# Patient Record
Sex: FEMALE | Race: BLACK OR AFRICAN AMERICAN
[De-identification: names, ages, dates, MRNs, and addresses within clinical notes are randomized per-mention and may not be internally consistent; named-entity substitution may affect disease eponyms.]

---

## 2021-10-08 ENCOUNTER — HOSPITAL ENCOUNTER (OUTPATIENT)
Dept: HOSPITAL 5 - TRG | Age: 26
Discharge: HOME | End: 2021-10-08
Attending: OBSTETRICS & GYNECOLOGY
Payer: COMMERCIAL

## 2021-10-08 VITALS — SYSTOLIC BLOOD PRESSURE: 106 MMHG | DIASTOLIC BLOOD PRESSURE: 66 MMHG

## 2021-10-08 DIAGNOSIS — O26.893: ICD-10-CM

## 2021-10-08 DIAGNOSIS — Z3A.35: ICD-10-CM

## 2021-10-08 DIAGNOSIS — Z34.93: Primary | ICD-10-CM

## 2021-10-08 DIAGNOSIS — O36.8130: ICD-10-CM

## 2021-10-08 PROCEDURE — 76815 OB US LIMITED FETUS(S): CPT

## 2021-10-08 PROCEDURE — 59025 FETAL NON-STRESS TEST: CPT

## 2021-10-08 PROCEDURE — 76816 OB US FOLLOW-UP PER FETUS: CPT

## 2021-10-08 PROCEDURE — 76819 FETAL BIOPHYS PROFIL W/O NST: CPT

## 2021-10-08 NOTE — ULTRASOUND REPORT
ULTRASOUND FETAL BIOPHYSICAL PROFILE



INDICATION / CLINICAL INFORMATION: EARLIER TODAY BPP 6/8.

Clinical Gestational Age (GA) in weeks, days: 36 weeks 2 days 



TECHNIQUE: Transabdominal.



COMPARISON: Ultrasound performed earlier today



FINDINGS:



FETAL BREATHING MOVEMENT = 2

GROSS BODY MOVEMENT = 2

FETAL TONE = 2

QUALITATIVE AMNIOTIC FLUID VOLUME = 2



TOTAL BIOPHYSICAL SCORE = 8/8



FETAL HEART RATE (beats per minute): 37 



IMPRESSION:

1. Fetal Biophysical Score = 8/8 

2. This represents interval improvement compared with ultrasound performed earlier today for which th
ere was a score of 6 out of 8.



Signer Name: Tish Weir MD 

Signed: 10/8/2021 10:42 PM

Workstation Name: [id number]

## 2021-10-08 NOTE — ULTRASOUND REPORT
LIMITED OBSTETRICAL ULTRASOUND



INDICATION: Decreased fetal movement, JULIO assessment, 36 week 2 day pregnancy



COMPARISON: None



FINDINGS: Term pregnancy is seen in a cephalic position. Cardiac activity was documented with fetal h
eart rate of 128 bpm. Placenta is not well evaluated but is posterior and free of the internal cervic
al os. Amniotic fluid volume appears qualitatively within normal limits for this stage of pregnancy a
nd JULIO is within normal limits at 9.6 cm. Dating was not performed nor was an anatomic survey elvie carter



Signkeshav Name: Clinton Mclean MD 

Signed: 10/8/2021 4:26 PM

Workstation Name: [id number]

## 2021-10-08 NOTE — ULTRASOUND REPORT
ULTRASOUND FETAL BIOPHYSICAL PROFILE



INDICATION / CLINICAL INFORMATION:

DECREASED FETAL MOVEMENT - BPP.



COMPARISON:

No prior imaging available for comparison.



FINDINGS:



FETAL BREATHING MOVEMENT = 0

GROSS BODY MOVEMENT = 2

FETAL TONE = 2

QUALITATIVE AMNIOTIC FLUID VOLUME = 2



TOTAL BIOPHYSICAL SCORE = 6/8

PRESENTATION: Cephalic. 

FETAL HEART RATE (beats per minute): 127 



IMPRESSION:

1. Single live intrauterine pregnancy. Fetal biophysical profile = 6/8, related to diminished fetal b
reathing. Close clinical surveillance is recommended.



Signer Name: Ran Coleman MD 

Signed: 10/8/2021 4:42 PM

Workstation Name: [id number]

## 2021-10-08 NOTE — EVENT NOTE
Date: 10/08/21


 pregnancy at 36+ weeks with decreased fetal movement.  BPP 8/10 with no 

breathing.  pt given steroids in anticipation of possible delivery.  

Betamethasone 12mg IM given, first dose.  Pt allowed to remain in triage for 

6hrs and given diet.  Repeat BPP 10/10 and pt discharged home.  PTL precaution 

given and pt admits to feeling baby moving well.  All questions encouraged and 

answered.

## 2021-10-09 ENCOUNTER — HOSPITAL ENCOUNTER (OUTPATIENT)
Dept: HOSPITAL 5 - TRG | Age: 26
Discharge: HOME | End: 2021-10-09
Attending: OBSTETRICS & GYNECOLOGY
Payer: COMMERCIAL

## 2021-10-09 DIAGNOSIS — Z34.93: Primary | ICD-10-CM

## 2021-10-09 DIAGNOSIS — Z3A.36: ICD-10-CM

## 2021-10-09 PROCEDURE — 96372 THER/PROPH/DIAG INJ SC/IM: CPT

## 2021-10-13 ENCOUNTER — HOSPITAL ENCOUNTER (INPATIENT)
Dept: HOSPITAL 5 - TRG | Age: 26
LOS: 2 days | Discharge: HOME | End: 2021-10-15
Attending: OBSTETRICS & GYNECOLOGY | Admitting: OBSTETRICS & GYNECOLOGY
Payer: COMMERCIAL

## 2021-10-13 DIAGNOSIS — Z3A.37: ICD-10-CM

## 2021-10-13 DIAGNOSIS — Z20.822: ICD-10-CM

## 2021-10-13 DIAGNOSIS — D62: ICD-10-CM

## 2021-10-13 DIAGNOSIS — Z23: ICD-10-CM

## 2021-10-13 LAB
BASOPHILS # (AUTO): 0 K/MM3 (ref 0–0.1)
BASOPHILS NFR BLD AUTO: 0.2 % (ref 0–1.8)
EOSINOPHIL # BLD AUTO: 0 K/MM3 (ref 0–0.4)
EOSINOPHIL NFR BLD AUTO: 0.1 % (ref 0–4.3)
HCT VFR BLD CALC: 35.4 % (ref 30.3–42.9)
HGB BLD-MCNC: 11.7 GM/DL (ref 10.1–14.3)
LYMPHOCYTES # BLD AUTO: 1.4 K/MM3 (ref 1.2–5.4)
LYMPHOCYTES NFR BLD AUTO: 16.5 % (ref 13.4–35)
MCHC RBC AUTO-ENTMCNC: 33 % (ref 30–34)
MCV RBC AUTO: 82 FL (ref 79–97)
MONOCYTES # (AUTO): 0.4 K/MM3 (ref 0–0.8)
MONOCYTES % (AUTO): 5.3 % (ref 0–7.3)
PLATELET # BLD: 274 K/MM3 (ref 140–440)
RBC # BLD AUTO: 4.31 M/MM3 (ref 3.65–5.03)

## 2021-10-13 PROCEDURE — 86850 RBC ANTIBODY SCREEN: CPT

## 2021-10-13 PROCEDURE — U0003 INFECTIOUS AGENT DETECTION BY NUCLEIC ACID (DNA OR RNA); SEVERE ACUTE RESPIRATORY SYNDROME CORONAVIRUS 2 (SARS-COV-2) (CORONAVIRUS DISEASE [COVID-19]), AMPLIFIED PROBE TECHNIQUE, MAKING USE OF HIGH THROUGHPUT TECHNOLOGIES AS DESCRIBED BY CMS-2020-01-R: HCPCS

## 2021-10-13 PROCEDURE — 76816 OB US FOLLOW-UP PER FETUS: CPT

## 2021-10-13 PROCEDURE — 86901 BLOOD TYPING SEROLOGIC RH(D): CPT

## 2021-10-13 PROCEDURE — 90715 TDAP VACCINE 7 YRS/> IM: CPT

## 2021-10-13 PROCEDURE — 76819 FETAL BIOPHYS PROFIL W/O NST: CPT

## 2021-10-13 PROCEDURE — 85014 HEMATOCRIT: CPT

## 2021-10-13 PROCEDURE — 76815 OB US LIMITED FETUS(S): CPT

## 2021-10-13 PROCEDURE — 88307 TISSUE EXAM BY PATHOLOGIST: CPT

## 2021-10-13 PROCEDURE — 86900 BLOOD TYPING SEROLOGIC ABO: CPT

## 2021-10-13 PROCEDURE — 85018 HEMOGLOBIN: CPT

## 2021-10-13 PROCEDURE — 90471 IMMUNIZATION ADMIN: CPT

## 2021-10-13 PROCEDURE — 36415 COLL VENOUS BLD VENIPUNCTURE: CPT

## 2021-10-13 PROCEDURE — 85025 COMPLETE CBC W/AUTO DIFF WBC: CPT

## 2021-10-13 PROCEDURE — 84112 EVAL AMNIOTIC FLUID PROTEIN: CPT

## 2021-10-13 NOTE — ULTRASOUND REPORT
ULTRASOUND OBSTETRIC 



INDICATION / CLINICAL INFORMATION: NEED FOR nrfht.

Clinical Gestational Age (GA) in weeks, days: 37, 0 



TECHNIQUE: Transabdominal.



COMPARISON: 10/8/2021



FINDINGS:

Single intrauterine pregnancy. 



Biparietal Diameter = 9 cm = 36, 3 weeks, days

Head Circumference = 32.7 cm = 37, 1 weeks, days

Abdominal Circumference = 30.3 cm = 34, 2 weeks, days

Femur Length = 6.9 cm = 35, 4 weeks, days

Average Ultrasound Age (AUA) = 35, 6 weeks, days



Fetal Heart Rate: 176  beats per minute. 

Estimated Fetal Birth Weight in grams (if calculated): 2611





Fetal Position: cephalic. 





Amniotic Fluid Volume: normal 

Amniotic Fluid Index (JULIO) in cm (if calculated): 9.6.



IMPRESSION:

1. Single, living intrauterine pregnancy with estimated sonographic age of 35, 6 weeks, days.

2. No significant sonographic abnormality.





ULTRASOUND FETAL BIOPHYSICAL PROFILE



INDICATION / CLINICAL INFORMATION: NEED FOR nrfht.



COMPARISON: 10/8/2021



FINDINGS:



FETAL BREATHING MOVEMENT = 2

GROSS BODY MOVEMENT = 2

FETAL TONE = 2

QUALITATIVE AMNIOTIC FLUID VOLUME = 2



TOTAL BIOPHYSICAL SCORE = 8/8



AMNIOTIC FLUID INDEX (cm) =  9.6

PRESENTATION: Cephalic. 

FETAL HEART RATE (beats per minute): 176 



IMPRESSION:

1. Fetal biophysical profile = 8/8



Signer Name: Javon Riley MD 

Signed: 10/13/2021 6:16 PM

Workstation Name: [id number]

## 2021-10-13 NOTE — ULTRASOUND REPORT
ULTRASOUND OBSTETRIC LIMITED 



INDICATION / CLINICAL INFORMATION: JULIO.

Clinical Gestational Age (GA) in weeks, days: 37, 0



TECHNIQUE: Transabdominal.



COMPARISON: Ultrasound from earlier in the day.



FINDINGS:



FETAL HEART RATE (beats per minute): 138 



AMNIOTIC FLUID INDEX (cm) =  13.5   (normal = 7-24 cm)



PRESENTATION: Cephalic. 



ADDITIONAL FINDINGS: None.



IMPRESSION:

1. No significant abnormality.



Signer Name: Cruzito Seals DO 

Signed: 10/13/2021 10:20 PM

Workstation Name: [id number]

## 2021-10-13 NOTE — ULTRASOUND REPORT
ULTRASOUND OBSTETRIC 



INDICATION / CLINICAL INFORMATION: NEED FOR nrfht.

Clinical Gestational Age (GA) in weeks, days: 37, 0 



TECHNIQUE: Transabdominal.



COMPARISON: 10/8/2021



FINDINGS:

Single intrauterine pregnancy. 



Biparietal Diameter = 9 cm = 36, 3 weeks, days

Head Circumference = 32.7 cm = 37, 1 weeks, days

Abdominal Circumference = 30.3 cm = 34, 2 weeks, days

Femur Length = 6.9 cm = 35, 4 weeks, days

Average Ultrasound Age (AUA) = 35, 6 weeks, days



Fetal Heart Rate: 176  beats per minute. 

Estimated Fetal Birth Weight in grams (if calculated): 2611





Fetal Position: cephalic. 





Amniotic Fluid Volume: normal 

Amniotic Fluid Index (JULIO) in cm (if calculated): 9.6.



IMPRESSION:

1. Single, living intrauterine pregnancy with estimated sonographic age of 35, 6 weeks, days.

2. No significant sonographic abnormality.





ULTRASOUND FETAL BIOPHYSICAL PROFILE



INDICATION / CLINICAL INFORMATION: NEED FOR nrfht.



COMPARISON: 10/8/2021



FINDINGS:



FETAL BREATHING MOVEMENT = 2

GROSS BODY MOVEMENT = 2

FETAL TONE = 2

QUALITATIVE AMNIOTIC FLUID VOLUME = 2



TOTAL BIOPHYSICAL SCORE = 8/8



AMNIOTIC FLUID INDEX (cm) =  9.6

PRESENTATION: Cephalic. 

FETAL HEART RATE (beats per minute): 176 



IMPRESSION:

1. Fetal biophysical profile = 8/8



Signer Name: Javon Riley MD 

Signed: 10/13/2021 6:16 PM

Workstation Name: [id number]

## 2021-10-14 LAB
HCT VFR BLD CALC: 29.4 % (ref 30.3–42.9)
HGB BLD-MCNC: 9.9 GM/DL (ref 10.1–14.3)

## 2021-10-14 PROCEDURE — 3E0R3BZ INTRODUCTION OF ANESTHETIC AGENT INTO SPINAL CANAL, PERCUTANEOUS APPROACH: ICD-10-PCS | Performed by: OBSTETRICS & GYNECOLOGY

## 2021-10-14 PROCEDURE — 00HU33Z INSERTION OF INFUSION DEVICE INTO SPINAL CANAL, PERCUTANEOUS APPROACH: ICD-10-PCS | Performed by: OBSTETRICS & GYNECOLOGY

## 2021-10-14 PROCEDURE — 0KQM0ZZ REPAIR PERINEUM MUSCLE, OPEN APPROACH: ICD-10-PCS | Performed by: OBSTETRICS & GYNECOLOGY

## 2021-10-14 RX ADMIN — IBUPROFEN SCH MG: 600 TABLET, FILM COATED ORAL at 08:37

## 2021-10-14 RX ADMIN — IBUPROFEN SCH MG: 600 TABLET, FILM COATED ORAL at 14:17

## 2021-10-14 RX ADMIN — Medication SCH EACH: at 09:39

## 2021-10-14 RX ADMIN — IBUPROFEN SCH MG: 600 TABLET, FILM COATED ORAL at 20:53

## 2021-10-14 NOTE — ANESTHESIA CONSULTATION
Anesthesia Consult and Med Hx


Date of service: 10/14/21





- Airway


Anesthetic Teeth Evaluation: Good


ROM Head & Neck: Adequate


Mental/Hyoid Distance: Adequate


Mallampati Class: Class II


Intubation Access Assessment: Probably Good





- Pulmonary Exam


CTA: Yes





- Cardiac Exam


Cardiac Exam: RRR





- Pre-Operative Health Status


ASA Pre-Surgery Classification: ASA2


Proposed Anesthetic Plan: Epidural





- Pulmonary


Hx Smoking: No


Hx Asthma: No


COPD: No


Hx Pneumonia: No


Hx Sleep Apnea: No





- Cardiovascular System


Hx Hypertension: No


Hx Heart Attack/AMI: No


Hx Angina: No





- Central Nervous System


Hx Seizures: No


Hx Psychiatric Problems: No





- Gastrointestinal


Hx Gastroesophageal Reflux Disease: No





- Endocrine


Hx Renal Disease: No


Hx End Stage Renal Disease: No


Hx Liver Disease: No


Hx Insulin Dependent Diabetes: No


Hx Non-Insulin Dependent Diabetes: No


Hx Hypothyroidism: No


Hx Hyperthyroidism: No





- Hematic


Hx Anemia: No


Hx Sickle Cell Disease: No

## 2021-10-14 NOTE — HISTORY AND PHYSICAL REPORT
History of Present Illness


Date of examination: 10/14/21


Date of admission: 


10/14/2021





Chief complaint: 


Presents from office for observation due to a non-reactive NST.





History of present illness: 


Early entry to prenatal care, prenatal course uncomplicated








Past History


Past Medical History: no pertinent history


Past Surgical History: no surgical history


Family/Genetic History: none


Social history: no significant social history, single





- Obstetrical History


Expected Date of Delivery: 21


Actual Gestation: 37 Week(s) 1 Day(s) 


: 1





Medications and Allergies


                                    Allergies











Allergy/AdvReac Type Severity Reaction Status Date / Time


 


No Known Allergies Allergy   Unverified 10/08/21 14:05











                                Home Medications











 Medication  Instructions  Recorded  Confirmed  Last Taken  Type


 


Prenatal Multivitamin Tablet  10/13/21  10/12/21 History











Active Meds: 


Active Medications





Acetaminophen (Acetaminophen 325 Mg Tab)  650 mg PO Q4H PRN


   PRN Reason: Pain, Mild (1-3)


Butorphanol Tartrate (Butorphanol 2 Mg/1 Ml Inj)  2 mg IV Q2H PRN


   PRN Reason: Pain , Severe (7-10)


Carboprost Tromethamine (Carboprost Tromethamine 250 Mcg/1 Ml Inj)  250 mcg IM 

ONCE PRN


   PRN Reason: Uterine Bleeding


Ephedrine Sulfate (Ephedrine Sulfate 50 Mg/1 Ml Inj)  10 mg IV Q2M PRN


   PRN Reason: Hypotension


Lactated Ringer's (Lactated Ringers)  1,000 mls @ 125 mls/hr IV AS DIRECT KIRAN


Oxytocin/Sodium Chloride (Pitocin/Ns 30 Unit/500ml)  30 units in 500 mls @ 2 

mls/hr IV TITR KIRAN; Protocol


Lactated Ringer's (Lactated Ringers)  1,000 mls @ 125 mls/hr IV AS DIRECT KIRAN


Oxytocin/Sodium Chloride (Pitocin/Ns 30 Unit/500ml)  30 units in 500 mls @ 40 

mls/hr IV TITR KIRAN; Protocol


Lidocaine (Lidocaine (2%) 20 Mg/1 Ml Vial 20 Ml Mdv)  20 ml INFILTRATI ONCE ONE


   Stop: 10/14/21 00:37


Loperamide HCl (Loperamide 2 Mg Cap)  2 mg PO ONCE PRN


   PRN Reason: give with Hemabate


Methylergonovine Maleate (Methylergonovine Maleate 0.2 Mg/Ml Vial)  0.2 mg IM 

ONCE PRN


   PRN Reason: Uterine Bleeding


Mineral Oil (Mineral Oil 30 Ml Oral Liqd)  30 ml PO QHS PRN


   PRN Reason: Constipation


Misoprostol (Misoprostol 200 Mcg Tab)  800 mcg MA ONCE PRN


   PRN Reason: Uterine Bleeding


Naloxone HCl (Naloxone 0.4 Mg/1 Ml Inj)  0.1 mg IV Q2MIN PRN


   PRN Reason: Res Rate </= 8 or 02 SAT < 92%


Ondansetron HCl (Ondansetron 4 Mg/2 Ml Inj)  4 mg IV Q8H PRN


   PRN Reason: Nausea And Vomiting


Oxytocin (Oxytocin 10 Unit/1 Ml Inj)  10 unit IM ONCE PRN


   PRN Reason: Uterine Bleeding


Terbutaline Sulfate (Terbutaline 1 Mg/1 Ml Inj)  0.25 mg SUB-Q ONCE PRN


   PRN Reason: Hyperstimulation/Hypertonicity











Review of Systems


All systems: negative





- Vital Signs


Vital signs: 


                                   Vital Signs











Pulse BP Pulse Ox


 


 95 H  117/66   99 


 


 10/13/21 16:28  10/13/21 16:28  10/13/21 16:28








                                        











Temp Pulse Resp BP Pulse Ox


 


 98.4 F   64   24   120/60   99 


 


 10/13/21 22:09  10/14/21 00:39  10/13/21 22:09  10/13/21 19:04  10/14/21 00:39














- Physical Exam


Breasts: Positive: normal


Cardiovascular: Regular rate


Lungs: Positive: Clear to auscultation, Normal air movement


Abdomen: Positive: normal appearance, soft, normal bowel sounds


Genitourinary (Female): Positive: normal external genitalia, normal perenium


Uterus: Positive: enlarged


Anus/Rectum: Positive: normal perianal skin





- Obstetrical


FHR: category 1


Uterine Contraction Monitor Mode: External


Cervical Dilatation: 5 ( SROM of a large amount of clear fluid at 2341)


Cervical Effacement Percentage: 90


Fetal station: -1


Uterine Contraction Pattern: Regular


Uterine Tone Measurement Phase: Resting


Uterine Contraction Intensity: Moderate





Results


Result Diagrams: 


                                 10/13/21 18:37





                              Abnormal lab results











  10/13/21 10/13/21 Range/Units





  18:37 23:20 


 


MCH  27 L   (28-32)  pg


 


Seg Neutrophils %  77.9 H   (40.0-70.0)  %


 


Fetal Membranes Rupture   Positive A  (Negative)  








All other labs normal.








Assessment and Plan


A: IUP @ 37 1/7 Weeks


     Category I Tracing 


     SROM


     Active Labor


     GBS Negative





P: Admit to L&D per Routine Orders 


    Prepare for Epidural Anesthesia

## 2021-10-14 NOTE — PROGRESS NOTE
Labor Epidural





- Labor Epidural


Start Time: 00:56


Stop Time: 01:08


Performed by:: DALLAS CAMARA


Procedure: 





Patient is requesting epidural for labor and pain.  H&P, labs were reviewed.   

Patient IDed, H&P reviewed, all questions and concerns were answered, and 

consent was signed. Timeout was performed at bedside.  Patient in sitting 

position.  Sterile prep and drape was performed.  3ml of 1% lidocaine skin wheal

at L[3]- L [4].  18-gauge [employer] epidural needle was advanced to loss of 

resistance with air technique 5cm.  Negative CSF negative blood.  Epidural 

catheter advanced to [10] centimeters.  [negative] Aspiration [negative] test 

dose.  Sterile dressing applied.  Patient tolerated procedure.

## 2021-10-15 VITALS — DIASTOLIC BLOOD PRESSURE: 62 MMHG | SYSTOLIC BLOOD PRESSURE: 114 MMHG

## 2021-10-15 PROCEDURE — 3E0234Z INTRODUCTION OF SERUM, TOXOID AND VACCINE INTO MUSCLE, PERCUTANEOUS APPROACH: ICD-10-PCS | Performed by: OBSTETRICS & GYNECOLOGY

## 2021-10-15 RX ADMIN — Medication SCH EACH: at 09:53

## 2021-10-15 RX ADMIN — IBUPROFEN SCH MG: 600 TABLET, FILM COATED ORAL at 05:24

## 2021-10-15 NOTE — CONSULTATION
History of Present Illness





- Reason for Consult


Consult date: 10/15/21


Reason for consult: high depression score





- Chief Complaint


Chief complaint: 


Presents from office for observation due to a non-reactive NST.








- History of Present Psychiatric Illness


Basia Mays is a 26 year old female with no psychiatric history. In my interview

with the patient, she is calm and cooperative. The patient is naive to 

psychotropic medications. She denies being depressed and denies having excessive

anxiety. The patient denies suicidal/homicidal ideation and denies 

hallucinations.





PAST PSYCHIATRIC HISTORY:


Diagnoses: Denies


Suicide attempts or Self-harm behavior: Denies


Prior psychiatric hospitalizations: Denies


Substance Abuse history: Denies


Previous psychiatric medications tried: Denies


Outpatient treatment: Denies





PAST MEDICAL HISTORY: Unknown





Family Psychiatric History: None reported or documented





SOCIAL HISTORY


Marital Status: Single


Living Arrangements: ALives with boyfriend


Employment Status: Unemployed


Access to guns/weapons: Denies


Education: 10th grade


History of Abuse: Denies


Legal History: Denies





REVIEW OF SYSTEMS


Constitutional: Negative for weight loss


ENT: Negative for stridor


Respiratory: Negative for cough or hemoptysis


All other systems reviewed and are negative





MENTAL STATUS EXAMINATION


General Appearance and Behavior: Age appropriate, good hygiene, not wearing 

appropriate clothes, good eye contact, cooperative polite with questioning.


Cooperation: Participating/engaged


Psychomotor Behavior: Psychomotor normal


Mood: "fine"


Affect and affective range: Euthymic


Thought Process: Goal directed


Speech: Normal tone and pace


Thought Content: Not suicidal


   Suicidal Ideation: Denies 


   Homicidal Ideation: Denies 


   Hallucinations: Denies


   Delusions: Denies


Impulse Control: Limited


Insight and Judgment: Good insight and Good judgment


Memory: Normal


Attention:  Divided attention impaired


Orientation: A/o x 3 





 Assessment and Plan 


(1) 


(2) 





Treatment Plan





Medical: per primary


Disposition: Do not recommend acute psychiatric inpatient treatment


Will sign off. Thanks


Case staffed with Dr. Parekh





Medications and Allergies


                                        





Medications and Allergies


                                    Allergies











Allergy/AdvReac Type Severity Reaction Status Date / Time


 


No Known Allergies Allergy   Verified 10/14/21 07:02











                                Home Medications











 Medication  Instructions  Recorded  Confirmed  Last Taken  Type


 


Prenatal Multivitamin Tablet 1 tab PO DAILY 10/13/21 10/14/21 10/12/21 History











Active Meds: 


Active Medications





Hydrocodone Bitart/Acetaminophen (Hydrocodone/Acetaminophen 5-325 Mg Tab)  2 

each PO Q6H PRN


   PRN Reason: Pain, Moderate (4-6)


Bisacodyl (Bisacodyl 10 Mg Rect Supp)  10 mg AL BID PRN


   PRN Reason: Constipation


Ferrous Sulfate (Ferrous Sulfate 325 Mg Tab)  325 mg PO BID KIRAN


Ibuprofen (Ibuprofen 600 Mg Tab)  600 mg PO Q6H [facility]


   Last Admin: 10/15/21 05:24 Dose:  600 mg


   Documented by: 


Multi-Ingredient Ointment (Lanolin/Zinc/Dimethicone (Lansinoh) 7 Gm)  1 applic 

TP PRN PRN


   PRN Reason: Sore Nipples


Multivitamins/Iron/Calcium (Prenatal Zjd09-Qu Fumarate-Folic Acid Vit Tab)  1 

each PO QDAY [facility]


   Last Admin: 10/15/21 09:53 Dose:  1 each


   Documented by: 


Sodium Chloride (Sodium Chloride 0.9% 10 Ml Flush Syringe)  10 ml IV PRN PRN


   PRN Reason: LINE FLUSH


Witch Hazel/Glycerin (Witch Hazel/ Glycerin Pad)  1 each TP PRN PRN


   PRN Reason: Hemorrhoid/cleansing/soothing


   Last Admin: 10/14/21 08:37 Dose:  1 each


   Documented by: 











Mental Status Exam





- Vital signs


                                Last Vital Signs











Temp  97.9 F   10/15/21 08:43


 


Pulse  87   10/15/21 08:43


 


Resp  18   10/15/21 08:43


 


BP  105/54   10/15/21 08:43


 


Pulse Ox  97   10/15/21 08:43














Results


Result Diagrams: 


                                 10/14/21 13:27





                              Abnormal lab results











  10/14/21 Range/Units





  13:27 


 


Hgb  9.9 L  (10.1-14.3)  gm/dl


 


Hct  29.4 L D  (30.3-42.9)  %








All other labs normal.

## 2021-10-15 NOTE — PROGRESS NOTE
Assessment and Plan


A: Postpartum day 1 S/P .


Anemia.


P: Supplement with iron. 


Continue routine postpartum care.


Anticipate discharge home tomorrow if patient continues to do well. 








Subjective





- Subjective


Date of service: 10/15/21


Principal diagnosis: Postpartum day 1 S/P 


Patient reports: appetite normal, voiding normally, pain well controlled, 

flatus, ambulating normally, no dizzy ambulation, no nauseated


[place]: doing well





Objective





- Vital Signs


Latest vital signs: 


                                   Vital Signs











  Temp Pulse Resp BP Pulse Ox Pulse Ox


 


 10/15/21 08:43  97.9 F  87  18  105/54  97 


 


 10/15/21 06:24    18   


 


 10/15/21 05:24    18   


 


 10/15/21 01:32  98.1 F  85  22  112/60  95 


 


 10/14/21 21:53    18   


 


 10/14/21 20:53    18   


 


 10/14/21 20:08       100


 


 10/14/21 16:34  99.0 F  104 H  18  106/69  93 


 


 10/14/21 14:17    16   








                                Intake and Output











 10/14/21 10/15/21 10/15/21





 23:59 07:59 15:59


 


Intake Total 600 500 


 


Balance 600 500 


 


Intake:   


 


  Oral 240  


 


  Intake, Free Water 360 500 


 


Other:   


 


  Total, Intake Amount 240  


 


  # Voids   


 


    Void 2 1 














- Exam


Cardiovascular: Present: Regular rate


Lungs: Present: Clear to auscultation


Abdomen: Present: normal appearance, soft, normal bowel sounds.  Absent: 

distention, tenderness, guarding, rigidity


Uterus: Present: normal, firm, fundal height below umbilicus.  Absent: 

bogginess, tenderness


Extremities: Absent: tenderness





- Labs


Labs: 


                              Abnormal lab results











  10/14/21 Range/Units





  13:27 


 


Hgb  9.9 L  (10.1-14.3)  gm/dl


 


Hct  29.4 L D  (30.3-42.9)  %

## 2021-10-15 NOTE — DISCHARGE SUMMARY
Providers





- Providers


Date of Admission: 


10/14/21 05:32





Date of discharge: 10/15/21


Attending physician: 


NATALIE ROSE MD





Primary care physician: 


NATALIE ROSE MD








Hospitalization


Reason for admission: induction of labor


Delivery: 


Laceration: 2nd degree


Other postpartum procedures: none


Postpartum complications: none


Discharge diagnosis: IUP at term delivered


[place] baby: female


Condition at discharge: Good


Disposition: 01 HOME / SELF CARE / HOMELESS





- Discharge Diagnoses


(1) Term pregnancy delivered


Status: Acute   





(2) Anemia


Status: Acute   





Plan





- Provider Discharge Summary


Activity: routine, no sex for 6 weeks, no heavy lifting 4 weeks, no strenuous 

exercise


Diet: routine


Instructions: routine


Additional instructions: 


Continue to take your prenatal vitamins and iron supplements at home. 


Follow up at [facility] office in 1-2 weeks. 





Call your doctor immediately for:


* Fever > 100.5


* Heavy vaginal bleeding ( >1 pad per hour)


* Severe persistent headache


* Shortness of breath


* Reddened, hot, painful area to leg or breast











- Follow up plan


Follow up: 


NATALIE ROSE MD [Primary Care Provider] - 7 Days


Forms:  [facility] Discharge Summary

## 2022-02-22 ENCOUNTER — HOSPITAL ENCOUNTER (OUTPATIENT)
Dept: HOSPITAL 5 - OR | Age: 27
Setting detail: OBSERVATION
LOS: 2 days | Discharge: HOME | End: 2022-02-24
Attending: STUDENT IN AN ORGANIZED HEALTH CARE EDUCATION/TRAINING PROGRAM | Admitting: INTERNAL MEDICINE
Payer: COMMERCIAL

## 2022-02-22 ENCOUNTER — OUT OF OFFICE VISIT (OUTPATIENT)
Dept: URBAN - METROPOLITAN AREA MEDICAL CENTER 41 | Facility: MEDICAL CENTER | Age: 27
End: 2022-02-22
Payer: COMMERCIAL

## 2022-02-22 DIAGNOSIS — R74.8 ABNORMAL ALKALINE PHOSPHATASE TEST: ICD-10-CM

## 2022-02-22 DIAGNOSIS — Z79.899: ICD-10-CM

## 2022-02-22 DIAGNOSIS — Z98.890: ICD-10-CM

## 2022-02-22 DIAGNOSIS — K80.50 BILE DUCT CALCULUS: ICD-10-CM

## 2022-02-22 DIAGNOSIS — E46: ICD-10-CM

## 2022-02-22 DIAGNOSIS — K80.00: Primary | ICD-10-CM

## 2022-02-22 DIAGNOSIS — K80.50: ICD-10-CM

## 2022-02-22 DIAGNOSIS — R93.2 ABN FIND-BILIARY TRACT: ICD-10-CM

## 2022-02-22 DIAGNOSIS — Z90.49: ICD-10-CM

## 2022-02-22 DIAGNOSIS — E44.1: ICD-10-CM

## 2022-02-22 LAB
ALBUMIN SERPL-MCNC: 3.6 G/DL (ref 3.9–5)
ALT SERPL-CCNC: 23 UNITS/L (ref 7–56)
BASOPHILS # (AUTO): 0 K/MM3 (ref 0–0.1)
BASOPHILS NFR BLD AUTO: 0 % (ref 0–1.8)
BUN SERPL-MCNC: 7 MG/DL (ref 7–17)
BUN/CREAT SERPL: 10 %
CALCIUM SERPL-MCNC: 8.8 MG/DL (ref 8.4–10.2)
EOSINOPHIL # BLD AUTO: 0 K/MM3 (ref 0–0.4)
EOSINOPHIL NFR BLD AUTO: 0 % (ref 0–4.3)
HCT VFR BLD CALC: 33.6 % (ref 30.3–42.9)
HEMOLYSIS INDEX: 14
HGB BLD-MCNC: 11.5 GM/DL (ref 10.1–14.3)
LYMPHOCYTES # BLD AUTO: 1 K/MM3 (ref 1.2–5.4)
LYMPHOCYTES NFR BLD AUTO: 12.6 % (ref 13.4–35)
MCHC RBC AUTO-ENTMCNC: 34 % (ref 30–34)
MCV RBC AUTO: 81 FL (ref 79–97)
MONOCYTES # (AUTO): 0.3 K/MM3 (ref 0–0.8)
MONOCYTES % (AUTO): 4.2 % (ref 0–7.3)
PLATELET # BLD: 354 K/MM3 (ref 140–440)
RBC # BLD AUTO: 4.17 M/MM3 (ref 3.65–5.03)

## 2022-02-22 PROCEDURE — 43264 ERCP REMOVE DUCT CALCULI: CPT

## 2022-02-22 PROCEDURE — 99222 1ST HOSP IP/OBS MODERATE 55: CPT | Performed by: INTERNAL MEDICINE

## 2022-02-22 PROCEDURE — 96366 THER/PROPH/DIAG IV INF ADDON: CPT

## 2022-02-22 PROCEDURE — 43264 ERCP REMOVE DUCT CALCULI: CPT | Performed by: INTERNAL MEDICINE

## 2022-02-22 PROCEDURE — 81025 URINE PREGNANCY TEST: CPT

## 2022-02-22 PROCEDURE — C1726 CATH, BAL DIL, NON-VASCULAR: HCPCS

## 2022-02-22 PROCEDURE — 96372 THER/PROPH/DIAG INJ SC/IM: CPT

## 2022-02-22 PROCEDURE — 88304 TISSUE EXAM BY PATHOLOGIST: CPT

## 2022-02-22 PROCEDURE — 36415 COLL VENOUS BLD VENIPUNCTURE: CPT

## 2022-02-22 PROCEDURE — 96375 TX/PRO/DX INJ NEW DRUG ADDON: CPT

## 2022-02-22 PROCEDURE — 80053 COMPREHEN METABOLIC PANEL: CPT

## 2022-02-22 PROCEDURE — 43262 ENDO CHOLANGIOPANCREATOGRAPH: CPT | Performed by: INTERNAL MEDICINE

## 2022-02-22 PROCEDURE — G8427 DOCREV CUR MEDS BY ELIG CLIN: HCPCS | Performed by: INTERNAL MEDICINE

## 2022-02-22 PROCEDURE — G0378 HOSPITAL OBSERVATION PER HR: HCPCS

## 2022-02-22 PROCEDURE — 96365 THER/PROPH/DIAG IV INF INIT: CPT

## 2022-02-22 PROCEDURE — 96376 TX/PRO/DX INJ SAME DRUG ADON: CPT

## 2022-02-22 PROCEDURE — 85027 COMPLETE CBC AUTOMATED: CPT

## 2022-02-22 PROCEDURE — 74330 X-RAY BILE/PANC ENDOSCOPY: CPT

## 2022-02-22 PROCEDURE — 74300 X-RAY BILE DUCTS/PANCREAS: CPT

## 2022-02-22 PROCEDURE — 85025 COMPLETE CBC W/AUTO DIFF WBC: CPT

## 2022-02-22 PROCEDURE — 47563 LAPARO CHOLECYSTECTOMY/GRAPH: CPT

## 2022-02-22 RX ADMIN — HYDROMORPHONE HYDROCHLORIDE PRN MG: 1 INJECTION, SOLUTION INTRAMUSCULAR; INTRAVENOUS; SUBCUTANEOUS at 12:55

## 2022-02-22 RX ADMIN — Medication SCH ML: at 21:14

## 2022-02-22 RX ADMIN — HYDROMORPHONE HYDROCHLORIDE PRN MG: 1 INJECTION, SOLUTION INTRAMUSCULAR; INTRAVENOUS; SUBCUTANEOUS at 18:24

## 2022-02-22 RX ADMIN — HEPARIN SODIUM SCH UNIT: 5000 INJECTION, SOLUTION INTRAVENOUS; SUBCUTANEOUS at 21:13

## 2022-02-22 RX ADMIN — PIPERACILLIN AND TAZOBACTAM SCH MLS/HR: 4; .5 INJECTION, POWDER, FOR SOLUTION INTRAVENOUS at 20:03

## 2022-02-22 RX ADMIN — FAMOTIDINE SCH MG: 10 INJECTION, SOLUTION INTRAVENOUS at 21:14

## 2022-02-22 RX ADMIN — HYDROMORPHONE HYDROCHLORIDE PRN MG: 1 INJECTION, SOLUTION INTRAMUSCULAR; INTRAVENOUS; SUBCUTANEOUS at 12:45

## 2022-02-22 RX ADMIN — PIPERACILLIN AND TAZOBACTAM SCH MLS/HR: 4; .5 INJECTION, POWDER, FOR SOLUTION INTRAVENOUS at 22:38

## 2022-02-22 RX ADMIN — SODIUM CHLORIDE, SODIUM LACTATE, POTASSIUM CHLORIDE, AND CALCIUM CHLORIDE SCH MLS/HR: .6; .31; .03; .02 INJECTION, SOLUTION INTRAVENOUS at 08:55

## 2022-02-22 RX ADMIN — HYDROMORPHONE HYDROCHLORIDE PRN MG: 1 INJECTION, SOLUTION INTRAMUSCULAR; INTRAVENOUS; SUBCUTANEOUS at 16:01

## 2022-02-22 NOTE — CONSULTATION
History of Present Illness





- Reason for Consult


Consult date: 02/22/22


Abnormal IOC


Requesting physician: GIORGIO HUDSON





- History of Present Illness


Ms. Wynn is a 26-year-old woman who underwent cholecystectomy today.  Because of

abnormal liver enzymes as an outpatient, she underwent an intraoperative 

cholangiogram which shows a distal filling defect.  GI consultation is obtained.

 Patient had a baby 4 months ago, and states that for the last 3 months she has 

had intermittent epigastric pain with nausea.


She denies any prior known history of liver disease.  She was seen in PACU after

surgery, and is still benefiting from pain medications that she received during 

surgery so she has no abdominal pain at present.





She is on no medications at home.








Past History


Past Surgical History: cholecystectomy (2/22/2022)


Social history: denies: smoking, alcohol abuse





Medications and Allergies


                                    Allergies











Allergy/AdvReac Type Severity Reaction Status Date / Time


 


No Known Allergies Allergy   Verified 02/18/22 14:55











                                Home Medications











 Medication  Instructions  Recorded  Confirmed  Last Taken  Type


 


No Known Home Medications [No  02/18/22 02/18/22 Unknown History





Reported Home Medications]     











Active Meds: 


Active Medications





Acetaminophen (Acetaminophen 500 Mg Tab)  1,000 mg PO PREOP KIRAN


   Stop: 02/22/22 21:00


   Last Admin: 02/22/22 08:55 Dose:  1,000 mg


   


Cefazolin Sodium (Cefazolin/Sterile Water 2 Gm/20 Ml Syringe)  2 gm IV PREOP NR


   Stop: 02/22/22 19:00


Celecoxib (Celecoxib 200 Mg Cap)  400 mg PO PREOP NR


   Stop: 02/22/22 21:00


   Last Admin: 02/22/22 08:55 Dose:  400 mg


   


Famotidine (Famotidine 20 Mg/2 Ml Inj)  20 mg IV PREOP NR


   Stop: 02/22/22 21:00


   Last Admin: 02/22/22 08:55 Dose:  20 mg


   


Heparin Sodium (Porcine) (Heparin 5,000 Unit/1 Ml Vial)  5,000 unit SUB-Q PREOP 

NR


   Stop: 02/22/22 18:00


   Last Admin: 02/22/22 10:07 Dose:  5,000 unit


   


Hydromorphone HCl (Hydromorphone 1 Mg/1 Ml Inj)  0.5 mg IV Q10MIN PRN


   PRN Reason: Pain , Severe (7-10)


   Stop: 02/22/22 20:00


   Last Admin: 02/22/22 16:01 Dose:  0.5 mg


   


Lactated Ringer's (Lactated Ringers)  1,000 mls @ 100 mls/hr IV AS DIRECT KIRAN


   Last Admin: 02/22/22 08:55 Dose:  100 mls/hr


   


Magnesium Oxide (Magnesium Oxide 400 Mg Tab)  400 mg PO PREOP KIRAN


   Stop: 02/22/22 21:00


   Last Admin: 02/22/22 08:55 Dose:  400 mg


   


Methocarbamol (Methocarbamol 750 Mg Tab)  1,500 mg PO PREOP KIRAN


   Stop: 02/22/22 21:00


   Last Admin: 02/22/22 08:55 Dose:  1,500 mg


   


Midazolam HCl (Midazolam 2 Mg/2 Ml Inj)  2 mg IV PREOP NR


   Stop: 02/22/22 23:59


   Last Admin: 02/22/22 09:00 Dose:  2 mg


   











Review of Systems


All systems: negative





Exam





- Constitutional


Vitals: 


                                        











Temp Pulse Resp BP Pulse Ox


 


 97.4 F L  73   18   109/62   97 


 


 02/22/22 15:30  02/22/22 17:00  02/22/22 17:00  02/22/22 17:00  02/22/22 17:00











General appearance: Present: no acute distress





- EENT


Eyes: Present: PERRL, EOM intact


ENT: hearing intact





- Respiratory


Respiratory effort: normal


Respiratory: bilateral: CTA (Anteriorly)





- Cardiovascular


Rhythm: regular


Heart Sounds: Present: S1 & S2





- Extremities


Extremities: No edema





- Abdominal


General gastrointestinal: Present: soft, normal bowel sounds





Results





- Labs


CBC & Chem 7: 


                                02/22/22 Unknown


Labs: 


                              Abnormal lab results











  02/22/22 Range/Units





  Unknown 


 


Chloride  108.4 H  ()  mmol/L


 


Carbon Dioxide  21 L  (22-30)  mmol/L


 


Alkaline Phosphatase  176 H  ()  units/L


 


Total Protein  5.9 L  (6.3-8.2)  g/dL


 


Albumin  3.6 L  (3.9-5)  g/dL














- Imaging and Cardiology


Abdominal x-ray: report reviewed (Intraoperative cholangiogram reviewed and 

shows meniscus sign in distal duct consistent with retained stone or debris)





Assessment and Plan


1.  Abnormal intraoperative cholangiogramconsistent with retained stone or 

debris in distal common bile duct.  Liver enzymes show elevated alkaline 

phosphatase though the transaminases, which were reportedly abnormal previously,

have normalized.


-We will check liver enzymes tomorrow and more than likely proceed with ERCP 

tomorrow.

## 2022-02-22 NOTE — FLUOROSCOPY REPORT
INTRAOPERATIVE FLUOROSCOPY: ABDOMEN



INDICATION:

RUQ PAIN.



TECHNIQUE:

Intraoperative spot images were obtained during the procedure.



FINDINGS:

Intraoperative angiography demonstrates expected opacification of the gallbladder, cystic duct and th
e bile ducts. Please see the operative report for further details.



Fluoroscopy Time: 1 minute 53 seconds. Fluoroscopy Images: 2.



Signer Name: German Khanna MD 

Signed: 2/22/2022 11:53 AM

Workstation Name: [id number]

## 2022-02-22 NOTE — ANESTHESIA CONSULTATION
Anesthesia Consult and Med Hx


Date of service: 02/22/22





- Airway


Anesthetic Teeth Evaluation: Good


ROM Head & Neck: Adequate


Mental/Hyoid Distance: Adequate


Mallampati Class: Class II


Intubation Access Assessment: Probably Good





- Pre-Operative Health Status


ASA Pre-Surgery Classification: ASA1


Proposed Anesthetic Plan: General





- Pulmonary


Hx Smoking: No


Hx Asthma: No


COPD: No


Hx Pneumonia: No


Hx Sleep Apnea: No





- Cardiovascular System


Hx Hypertension: No


Hx Heart Attack/AMI: No


Hx Angina: No





- Central Nervous System


Hx Seizures: No


Hx Psychiatric Problems: No





- Gastrointestinal


Hx Gastroesophageal Reflux Disease: No





- Endocrine


Hx Renal Disease: No


Hx End Stage Renal Disease: No


Hx Liver Disease: Yes (gallstones)


Hx Insulin Dependent Diabetes: No


Hx Non-Insulin Dependent Diabetes: No


Hx Hypothyroidism: No


Hx Hyperthyroidism: No





- Hematic


Hx Anemia: No


Hx Sickle Cell Disease: No





- Other Systems


Hx Alcohol Use: No


Hx Substance Use: No


Hx Cancer: No

## 2022-02-22 NOTE — HISTORY AND PHYSICAL REPORT
History of Present Illness


Date of examination: 02/22/22


Date of admission: 


February 22, 2022


Chief complaint: 


S/p cholecystectomy, common bile ducts stone





History of present illness: 


 26-year-old woman who underwent cholecystectomy today--Because of abnormal 

liver enzymes as an outpatient, she underwent an intraoperative cholangiogram 

which shows a distal filling defect.  Patient has been having intermittent right

upper quadrant pain and epigastric pain for the last 4 months associated with 

vomiting.  Patient had a baby 4 months ago


She denies any prior known history of liver disease.  No significant past 

medical history





Past History


Past Medical History: other (Cholecystitis and choledocholithiasis)


Past Surgical History: cholecystectomy


Social history: lives with family, full code


Family history: no significant family history





Medications and Allergies


                                    Allergies











Allergy/AdvReac Type Severity Reaction Status Date / Time


 


No Known Allergies Allergy   Verified 02/18/22 14:55











                                Home Medications











 Medication  Instructions  Recorded  Confirmed  Last Taken  Type


 


No Known Home Medications [No  02/18/22 02/18/22 Unknown History





Reported Home Medications]     











Active Meds: 


Active Medications





Acetaminophen (Acetaminophen 500 Mg Tab)  1,000 mg PO PREOP KIRAN


   Stop: 02/22/22 21:00


   Last Admin: 02/22/22 08:55 Dose:  1,000 mg


   


Cefazolin Sodium (Cefazolin/Sterile Water 2 Gm/20 Ml Syringe)  2 gm IV PREOP NR


   Stop: 02/22/22 19:00


Celecoxib (Celecoxib 200 Mg Cap)  400 mg PO PREOP NR


   Stop: 02/22/22 21:00


   Last Admin: 02/22/22 08:55 Dose:  400 mg


   


Famotidine (Famotidine 20 Mg/2 Ml Inj)  20 mg IV PREOP NR


   Stop: 02/22/22 21:00


   Last Admin: 02/22/22 08:55 Dose:  20 mg


   


Heparin Sodium (Porcine) (Heparin 5,000 Unit/1 Ml Vial)  5,000 unit SUB-Q PREOP 

NR


   Stop: 02/22/22 18:00


   Last Admin: 02/22/22 10:07 Dose:  5,000 unit


   


Hydromorphone HCl (Hydromorphone 1 Mg/1 Ml Inj)  0.5 mg IV Q10MIN PRN


   PRN Reason: Pain , Severe (7-10)


   Stop: 02/22/22 20:00


   Last Admin: 02/22/22 16:01 Dose:  0.5 mg


   


Lactated Ringer's (Lactated Ringers)  1,000 mls @ 100 mls/hr IV AS DIRECT KIRAN


   Last Admin: 02/22/22 08:55 Dose:  100 mls/hr


   


Magnesium Oxide (Magnesium Oxide 400 Mg Tab)  400 mg PO PREOP KIRAN


   Stop: 02/22/22 21:00


   Last Admin: 02/22/22 08:55 Dose:  400 mg


   


Methocarbamol (Methocarbamol 750 Mg Tab)  1,500 mg PO PREOP KIRAN


   Stop: 02/22/22 21:00


   Last Admin: 02/22/22 08:55 Dose:  1,500 mg


   


Midazolam HCl (Midazolam 2 Mg/2 Ml Inj)  2 mg IV PREOP NR


   Stop: 02/22/22 23:59


   Last Admin: 02/22/22 09:00 Dose:  2 mg


   











Review of Systems


All systems: negative





Exam





- Constitutional


Vitals: 


                                        











Temp Pulse Resp BP Pulse Ox


 


 97.4 F L  73   18   109/62   97 


 


 02/22/22 15:30  02/22/22 17:00  02/22/22 17:00  02/22/22 17:00  02/22/22 17:00











General appearance: Present: no acute distress, well-nourished





- EENT


Eyes: Present: PERRL


ENT: hearing intact, clear oral mucosa





- Neck


Neck: Present: supple, normal ROM





- Respiratory


Respiratory effort: normal


Respiratory: bilateral: CTA





- Cardiovascular


Heart rate: 78


Rhythm: regular


Heart Sounds: Present: S1 & S2.  Absent: rub, click





- Extremities


Extremities: pulses symmetrical, No edema


Peripheral Pulses: within normal limits





- Abdominal


General gastrointestinal: Present: soft, non-tender, non-distended, normal bowel

sounds


Localized gastrointestinal: tender: RUQ, epigastric periumbilical


Female genitourinary: Present: normal





- Rectal


Rectal Exam: deferred





- Integumentary


Integumentary: Present: clear, warm, dry





- Musculoskeletal


Musculoskeletal: gait normal, strength equal bilaterally





- Psychiatric


Psychiatric: appropriate mood/affect, intact judgment & insight





- Neurologic


Neurologic: CNII-XII intact, moves all extremities





- Allied Health


Allied health notes reviewed: nursing, case management





Results





- Labs


CBC & Chem 7: 


                                 02/22/22 23:14





                                02/22/22 Unknown


Labs: 


                             Laboratory Last Values











Sodium  144 mmol/L (137-145)   02/22/22  Unknown


 


Potassium  4.2 mmol/L (3.6-5.0)   02/22/22  Unknown


 


Chloride  108.4 mmol/L ()  H  02/22/22  Unknown


 


Carbon Dioxide  21 mmol/L (22-30)  L  02/22/22  Unknown


 


Anion Gap  19 mmol/L  02/22/22  Unknown


 


BUN  7 mg/dL (7-17)   02/22/22  Unknown


 


Creatinine  0.7 mg/dL (0.6-1.2)   02/22/22  Unknown


 


Estimated GFR  > 60 ml/min  02/22/22  Unknown


 


BUN/Creatinine Ratio  10 %  02/22/22  Unknown


 


Glucose  94 mg/dL ()   02/22/22  Unknown


 


Calcium  8.8 mg/dL (8.4-10.2)   02/22/22  Unknown


 


Total Bilirubin  0.20 mg/dL (0.1-1.2)   02/22/22  Unknown


 


AST  19 units/L (5-40)   02/22/22  Unknown


 


ALT  23 units/L (7-56)   02/22/22  Unknown


 


Alkaline Phosphatase  176 units/L ()  H  02/22/22  Unknown


 


Total Protein  5.9 g/dL (6.3-8.2)  L  02/22/22  Unknown


 


Albumin  3.6 g/dL (3.9-5)  L  02/22/22  Unknown


 


Albumin/Globulin Ratio  1.6 %  02/22/22  Unknown











Isbell/IV: 


                                        





Voiding Method                   Toilet











Assessment and Plan


Advance Directives: Yes (Full code)


VTE prophylaxis?: Chemical


Plan of care discussed with patient/family: Yes





- Patient Problems


(1) Status post cholecystectomy


Current Visit: Yes   Status: Acute   


Plan to address problem: 


Patient is stable postop


Pain is minimal








(2) Choledocholithiasis with acute cholecystitis


Current Visit: Yes   Status: Acute   


Plan to address problem: 


Filling defect was found on the cholangiogram 


GI was consulted for possible ERCP


Hence being admitted from PACU








(3) Malnutrition


Current Visit: Yes   Status: Chronic   


Qualifiers: 


   Protein-calorie malnutrition severity: mild 


Plan to address problem: 


Dietary supplements when patient resumes eating








(4) DVT prophylaxis


Current Visit: Yes   Status: Acute   


Plan to address problem: 


On SCDs and GI prophylaxis








(5) Advance care planning


Current Visit: Yes   Status: Acute   


Plan to address problem: 


Disease education conducted, care plan discussed, diagnosis discussed, prognosis

good.  Patient is full code.  Patient acknowledges understanding and agreement 

with care plan.  +30 minutes.

## 2022-02-22 NOTE — EVENT NOTE
Date: 02/22/22





Dr. Marti has seen the pt and plans to proceed with ERCP tomorrow.  Dr. Perez 

has been called and will admit the pt.

## 2022-02-22 NOTE — PROCEDURE NOTE
Date of procedure: 02/22/22


Pre-op diagnosis: Chronic cholecystitis


Post-op diagnosis: same


Procedure: 





Laparoscopic cholecystectomy with IOC





Description of procedure:  Pt was placed supine on the OR table.  GETA was 

administered.  Abdomen was prepped and draped.  Proposed trocar sites were 

infiltrated with 9 ml of 0.5% Marcaine.  A small infraumbilical incision was 

made, linea alba incised and the peritoneal cavity carefully entered.  A Michelle 

port was inserted into the peritoneal cavity and pneumoperitoneum established.  

A 10 mm subxiphoid, 5 mm RUQ and 5 mm right lateral ports were inserted into the

peritoneal cavity under direct vision.  Pt was placed head and right side up.  

Fundus of the gallbladder was grasped and retracted cephalad.  Cystic duct was 

skeletonized and the critical view of safety obtained.  Cystic duct was milked 

towards the gallbladder.  A small incision was made in the cystic duct.  The 

cholangiocatheter was inserted into the cyst duct and an IOC obtained using 20 

ml of 1/2 strength contrast.  This revealed what appeared to be several stones 

in the cystic distal to the cystotomy and a filling defect within the distal 

CBD.  Some contrast was able to get around the CBD defect.  The cystic duct was 

again milked from it's junction with the CBD to the cystotomy with expression of

3-4 small stones.  The duct was milked again and no additional stones were 

expressed.  The common duct side of the cystic duct was doubly clipped and the 

cystic duct completely divided.  No significant cystic artery was identified.  

Gallbladder was dissected off of its hepatic fossa with the Bovie.  Gallbladder 

was placed in an endobag and the endobag removed via the infraumbilical fascial 

defect.  The upper abdominal ports were removed with no bleeding from the port 

entry sites under low pressure.  The Michelle port was removed and the 

pneumoperitoneum released.  The infraumbilical fascial defect was closed with 2 

interrupted sutures of 0-Vicryl.  Skin incisions were closed with running 

subcuticular sutures of 4-0 Monocryl.  Skin glue was applied to all incisions.  

Pt tolerated the procedure well and was taken to PACU in stable condition.  





I have explained the management of choledocholithiasis to the pt's  and 

have put a call in to Aneesh Marti MD of [facility] re whether the pt needs 

to be admitted for a possible ERCP.  A stat CMP was also ordered in PACU to see 

if the pt's LFT's have significantly changed.  


Findings: 





Filling defect within the distal CBD c/w stones/sludge.


Anesthesia: GETA


Surgeon: GIORGIO HUDSON


Estimated blood loss: minimal


Pathology: list (Gallbladder and gallstones)


Specimen disposition: to lab


Condition: stable


Disposition: PACU

## 2022-02-23 LAB
ALBUMIN SERPL-MCNC: 3.3 G/DL (ref 3.9–5)
ALBUMIN SERPL-MCNC: 3.5 G/DL (ref 3.9–5)
ALT SERPL-CCNC: 18 UNITS/L (ref 7–56)
ALT SERPL-CCNC: 23 UNITS/L (ref 7–56)
BUN SERPL-MCNC: 4 MG/DL (ref 7–17)
BUN SERPL-MCNC: 6 MG/DL (ref 7–17)
BUN/CREAT SERPL: 6 %
BUN/CREAT SERPL: 9 %
CALCIUM SERPL-MCNC: 8.7 MG/DL (ref 8.4–10.2)
CALCIUM SERPL-MCNC: 8.9 MG/DL (ref 8.4–10.2)
HCT VFR BLD CALC: 35 % (ref 30.3–42.9)
HEMOLYSIS INDEX: 2
HEMOLYSIS INDEX: 6
HGB BLD-MCNC: 11.4 GM/DL (ref 10.1–14.3)
MCHC RBC AUTO-ENTMCNC: 33 % (ref 30–34)
MCV RBC AUTO: 81 FL (ref 79–97)
PLATELET # BLD: 344 K/MM3 (ref 140–440)
RBC # BLD AUTO: 4.32 M/MM3 (ref 3.65–5.03)

## 2022-02-23 RX ADMIN — PIPERACILLIN AND TAZOBACTAM SCH MLS/HR: 4; .5 INJECTION, POWDER, FOR SOLUTION INTRAVENOUS at 21:31

## 2022-02-23 RX ADMIN — SODIUM CHLORIDE SCH MLS/HR: 0.45 INJECTION, SOLUTION INTRAVENOUS at 14:00

## 2022-02-23 RX ADMIN — FAMOTIDINE SCH: 10 INJECTION, SOLUTION INTRAVENOUS at 13:47

## 2022-02-23 RX ADMIN — FAMOTIDINE SCH MG: 10 INJECTION, SOLUTION INTRAVENOUS at 21:33

## 2022-02-23 RX ADMIN — PIPERACILLIN AND TAZOBACTAM SCH MLS/HR: 4; .5 INJECTION, POWDER, FOR SOLUTION INTRAVENOUS at 05:37

## 2022-02-23 RX ADMIN — SODIUM CHLORIDE, SODIUM LACTATE, POTASSIUM CHLORIDE, AND CALCIUM CHLORIDE SCH MLS/HR: .6; .31; .03; .02 INJECTION, SOLUTION INTRAVENOUS at 05:37

## 2022-02-23 RX ADMIN — PIPERACILLIN AND TAZOBACTAM SCH MLS/HR: 4; .5 INJECTION, POWDER, FOR SOLUTION INTRAVENOUS at 13:56

## 2022-02-23 RX ADMIN — HEPARIN SODIUM SCH UNIT: 5000 INJECTION, SOLUTION INTRAVENOUS; SUBCUTANEOUS at 21:34

## 2022-02-23 RX ADMIN — HEPARIN SODIUM SCH: 5000 INJECTION, SOLUTION INTRAVENOUS; SUBCUTANEOUS at 13:47

## 2022-02-23 RX ADMIN — Medication SCH ML: at 21:35

## 2022-02-23 NOTE — OPERATIVE REPORT
DATE OF SURGERY: 02/23/2022



ERCP REPORT



PROCEDURES:  ERCP with sphincterotomy and stone removal with balloon sweep.



PREOPERATIVE DIAGNOSES:  Abnormal intraoperative cholangiogram and elevated 

liver enzymes.



POSTOPERATIVE DIAGNOSIS:  Papillary stenosis, most likely with dilated bile duct

and biliary sludge.



SEDATION:  MAC by Anesthesia.



BRIEF HISTORY:  The patient is a 26-year-old woman who had abnormal liver 

enzymes prior to cholecystectomy yesterday.  Intraoperative cholangiogram at 

that time showed distal filling defect.  Alkaline phosphatase was elevated at 

176.



DESCRIPTION OF PROCEDURE:  The indications, risks and benefits were explained 

and consent was obtained.  The patient was placed on abdomen on fluoroscopy 

table and sedated.  Video duodenoscope was passed through the mouth and 

oropharynx into the descending duodenum and then gradually withdrawn with close 

inspection of mucosa until the major papilla was visualized.  Selective 

cannulation of the pancreatic duct was first initially achieved with 

sphincterotome and guidewire.  After multiple attempts where the biliary system 

could not be cannulated, a precut papillotomy was done with the tip of the wire 

in the major papilla and the guidewire within the pancreatic duct.  

Subsequently, biliary orifice was cannulated and a sphincterotomy completed.



FINDINGS:

1.  Bulging, but major papilla with normal mucosa.

2.  Pancreatic duct is normal in course and caliber.

3.  Common bile duct is dilated to 12 mm with no clear filling defects noted.  

An 8 mm biliary sphincterotomy was completed.  Duct was swept with 12 mm balloon

with return of small amount of yellow amorphous stone debris.  No further 

filling defects were identified.  No leaks were identified.



The patient tolerated the procedure well without immediate complications.



IMPRESSION:

1.  Bulging, major papilla with normal mucosa.

2.  Normal pancreatic duct.

3.  Dilated common bile duct with biliary debris -- removed after sphincterotomy

and balloon sweep.



RECOMMENDATIONS:

1.  Monitor for complications.

2.  Avoid aspirin and nonsteroidals for 2 weeks.

3.  Follow up liver enzymes in 2-3 weeks to ensure normalization.







DD: 02/23/2022 12:23 PM

DT: 02/23/2022 12:40 PM

TID: [id number] RECEIPT: [id number]

[facility]



cc:     Tony Pratt MD, Dr. Angelito Chun

## 2022-02-23 NOTE — PROGRESS NOTE
Assessment and Plan


Assessment and plan: 





#Acute cholecystitis status post laparoscopic cholecystectomy


#Choledocholithiasis


Status post laparoscopic cholecystectomy by general surgery on 2/22/2022.  

Concern for filling defect of common bile duct.


Gastroenterology consulted for ERCP.  ERCP performed on 2/23/2022 with removal 

of yellow stones and sludge.  Appreciate recs.


Started on clear liquid diet.  Continue as needed analgesic.


Continue to monitor





#Mild protein caloric malnutrition


Continue dietary supplementation





#Advanced care planning


-Disease education conducted, care plan discussed, diagnoses discussed, 

prognosis discussed, and patient acknowledges understanding with care plan


-Time: +30 min





#Discharge planning


- Patient is pending clearance by GI and general surgery


- Case management has been made aware. 


- Discharge is tentatively tomorrow


Disposition Plan: Pending possible discharge home tomorrow


Total Time Spent with Patient (Minutes): 45 minutes





History


Interval history: 





Status post laparoscopic cholecystectomy last night with no complications.





Hospitalist Physical





- Constitutional


Vitals: 


                                        











Temp Pulse Resp BP Pulse Ox


 


 98.3 F   62   20   116/81   96 


 


 02/23/22 11:00  02/23/22 12:45  02/23/22 12:45  02/23/22 12:45  02/23/22 12:45











General appearance: Present: no acute distress, well-nourished





- EENT


Eyes: Present: PERRL, EOM intact


ENT: hearing intact, clear oral mucosa, dentition normal





- Neck


Neck: Present: supple, normal ROM





- Respiratory


Respiratory effort: normal


Respiratory: bilateral: CTA





- Cardiovascular


Rhythm: regular


Heart Sounds: Present: S1 & S2





- Extremities


Extremities: no ischemia, pulses intact, pulses symmetrical, No edema, normal t

emperature, normal color, Full ROM


Peripheral Pulses: within normal limits





- Abdominal


General gastrointestinal: soft, tender (Appropriate tenderness at incision 

sites), non-distended, normal bowel sounds





- Integumentary


Integumentary: Present: clear, warm, dry





- Psychiatric


Psychiatric: appropriate mood/affect, intact judgment & insight, memory intact, 

cooperative





- Neurologic


Neurologic: CNII-XII intact, moves all extremities





- Allied Health


Allied health notes reviewed: nursing





Results





- Labs


CBC & Chem 7: 


                                 02/23/22 14:14





                                 02/23/22 14:14


Labs: 


                             Laboratory Last Values











WBC  7.8 K/mm3 (4.5-11.0)   02/23/22  14:14    


 


RBC  4.32 M/mm3 (3.65-5.03)   02/23/22  14:14    


 


Hgb  11.4 gm/dl (10.1-14.3)   02/23/22  14:14    


 


Hct  35.0 % (30.3-42.9)   02/23/22  14:14    


 


MCV  81 fl (79-97)   02/23/22  14:14    


 


MCH  26 pg (28-32)  L  02/23/22  14:14    


 


MCHC  33 % (30-34)   02/23/22  14:14    


 


RDW  14.9 % (13.2-15.2)   02/23/22  14:14    


 


Plt Count  344 K/mm3 (140-440)   02/23/22  14:14    


 


Lymph % (Auto)  12.6 % (13.4-35.0)  L  02/22/22  23:14    


 


Mono % (Auto)  4.2 % (0.0-7.3)   02/22/22  23:14    


 


Eos % (Auto)  0.0 % (0.0-4.3)   02/22/22  23:14    


 


Baso % (Auto)  0.0 % (0.0-1.8)   02/22/22  23:14    


 


Lymph # (Auto)  1.0 K/mm3 (1.2-5.4)  L  02/22/22  23:14    


 


Mono # (Auto)  0.3 K/mm3 (0.0-0.8)   02/22/22  23:14    


 


Eos # (Auto)  0.0 K/mm3 (0.0-0.4)   02/22/22  23:14    


 


Baso # (Auto)  0.0 K/mm3 (0.0-0.1)   02/22/22  23:14    


 


Seg Neutrophils %  83.2 % (40.0-70.0)  H  02/22/22  23:14    


 


Seg Neutrophils #  6.7 K/mm3 (1.8-7.7)   02/22/22  23:14    


 


Sodium  143 mmol/L (137-145)   02/23/22  14:14    


 


Potassium  4.9 mmol/L (3.6-5.0)   02/23/22  14:14    


 


Chloride  106.6 mmol/L ()   02/23/22  14:14    


 


Carbon Dioxide  24 mmol/L (22-30)   02/23/22  14:14    


 


Anion Gap  17 mmol/L  02/23/22  14:14    


 


BUN  6 mg/dL (7-17)  L  02/23/22  14:14    


 


Creatinine  0.7 mg/dL (0.6-1.2)   02/23/22  14:14    


 


Estimated GFR  > 60 ml/min  02/23/22  14:14    


 


BUN/Creatinine Ratio  9 %  02/23/22  14:14    


 


Glucose  86 mg/dL ()   02/23/22  14:14    


 


Calcium  8.9 mg/dL (8.4-10.2)   02/23/22  14:14    


 


Total Bilirubin  0.20 mg/dL (0.1-1.2)   02/23/22  14:14    


 


AST  17 units/L (5-40)   02/23/22  14:14    


 


ALT  18 units/L (7-56)   02/23/22  14:14    


 


Alkaline Phosphatase  152 units/L ()  H  02/23/22  14:14    


 


Total Protein  5.5 g/dL (6.3-8.2)  L  02/23/22  14:14    


 


Albumin  3.3 g/dL (3.9-5)  L  02/23/22  14:14    


 


Albumin/Globulin Ratio  1.5 %  02/23/22  14:14    











Isbell/IV: 


                                        





Voiding Method                   Toilet











Active Medications





- Current Medications


Current Medications: 














Generic Name Dose Route Start Last Admin





  Trade Name Freq  PRN Reason Stop Dose Admin


 


Acetaminophen  650 mg  02/22/22 17:23 





  Acetaminophen 325 Mg Tab  PO  





  Q4H PRN  





  Pain MILD(1-3)/Fever >100.5/HA  


 


Famotidine  20 mg  02/22/22 22:00  02/23/22 13:47





  Famotidine 20 Mg/2 Ml Inj  IV   Not Given





  BID KIRAN  


 


Heparin Sodium (Porcine)  5,000 unit  02/22/22 22:00  02/23/22 13:47





  Heparin 5,000 Unit/1 Ml Vial  SUB-Q   Not Given





  Q12HR KIRAN  


 


Hydromorphone HCl  0.5 mg  02/22/22 17:23 





  Hydromorphone 1 Mg/1 Ml Inj  IV  





  Q3H PRN  





  Pain , Severe (7-10)  


 


Lactated Ringer's  1,000 mls @ 100 mls/hr  02/22/22 08:30  02/23/22 05:37





  Lactated Ringers  IV   100 mls/hr





  AS DIRECT KIRAN   Administration


 


Sodium Chloride  1,000 mls @ 75 mls/hr  02/22/22 18:00  02/23/22 14:00





  Nacl 0.45% 1000 Ml  IV   75 mls/hr





  AS DIRECT KIRAN   Administration


 


Piperacillin Sod/Tazobactam Sod  4.5 gm in 100 mls @ 200 mls/hr  02/22/22 18:00 

02/23/22 13:56





  Zosyn/Ns 4.5gm/100ml  IV   200 mls/hr





  Q8HR KIRAN   Administration





  Protocol  


 


Metoclopramide HCl  10 mg  02/22/22 17:23 





  Metoclopramide 10 Mg/2 Ml Inj  IV  





  Q6H PRN  





  Nausea And Vomiting  


 


Morphine Sulfate  2 mg  02/22/22 17:23 





  Morphine 2 Mg/1 Ml Inj  IV  





  Q4H PRN  





  Pain, Moderate (4-6)  


 


Ondansetron HCl  4 mg  02/22/22 17:23 





  Ondansetron 4 Mg/2 Ml Inj  IV  





  Q3H PRN  





  Nausea And Vomiting  


 


Sodium Chloride  10 ml  02/22/22 22:00  02/22/22 21:14





  Sodium Chloride 0.9% 10 Ml Flush Syringe  IV   10 ml





  BID KIRAN   Administration


 


Sodium Chloride  10 ml  02/22/22 17:23 





  Sodium Chloride 0.9% 10 Ml Flush Syringe  IV  





  PRN PRN  





  LINE FLUSH

## 2022-02-23 NOTE — ANESTHESIA DAY OF SURGERY
Anesthesia Day of Surgery





- Day of Surgery


Patient Examined: Yes


Patient H&P Reviewed: Yes (No changes to anesthesia eval on 2/22/22 - ASA1)


Patient is NPO: Yes

## 2022-02-23 NOTE — POST OPERATIVE NOTE
Pre-op diagnosis: Abnormal IOC, abnormal LFTs


Post-op diagnosis: other (Biliary sludge, dilated CBD)


Findings: 


1.  Bulging major papilla with normal mucosa.


2.  Normal pancreatic duct


3.  CBD dilated to 12 mm, s/p CCY.  Sphincterotomy done.


4.  Duct swept with return of small amount of yellow amorphous stone debris.





Procedure: 


ERCP with ES and stone removal.





Anesthesia: MAC


Surgeon: GALLO FERNANDEZ


Estimated blood loss: minimal


Pathology: none


Condition: stable


Disposition: floor (NPO now, clears after 4 hours.)

## 2022-02-23 NOTE — FLUOROSCOPY REPORT
INTRAOPERATIVE FLUOROSCOPY:



INDICATION / CLINICAL INFORMATION: Abnormal IOC.



TECHNIQUE: Intraoperative spot images were obtained during the procedure.



FINDINGS:

50 cc of Omnipaque 300 was injected during procedure. Stone present within the common duct near bifur
cation appears absent on the post procedure image.



Fluoroscopy Time: 0.3 minutes. Total dose: Not listed mGy. Fluoroscopy Images: 7. 



Signer Name: Grady Vela II, MD 

Signed: 2/23/2022 1:20 PM

Workstation Name: [id number]

## 2022-02-24 VITALS — SYSTOLIC BLOOD PRESSURE: 107 MMHG | DIASTOLIC BLOOD PRESSURE: 68 MMHG

## 2022-02-24 LAB
ALBUMIN SERPL-MCNC: 3.3 G/DL (ref 3.9–5)
ALT SERPL-CCNC: 19 UNITS/L (ref 7–56)
BASOPHILS # (AUTO): 0 K/MM3 (ref 0–0.1)
BASOPHILS NFR BLD AUTO: 0.5 % (ref 0–1.8)
BUN SERPL-MCNC: 5 MG/DL (ref 7–17)
BUN/CREAT SERPL: 7 %
CALCIUM SERPL-MCNC: 8.5 MG/DL (ref 8.4–10.2)
EOSINOPHIL # BLD AUTO: 0.1 K/MM3 (ref 0–0.4)
EOSINOPHIL NFR BLD AUTO: 1.7 % (ref 0–4.3)
HCT VFR BLD CALC: 35.9 % (ref 30.3–42.9)
HEMOLYSIS INDEX: 1
HGB BLD-MCNC: 11.6 GM/DL (ref 10.1–14.3)
LYMPHOCYTES # BLD AUTO: 3.8 K/MM3 (ref 1.2–5.4)
LYMPHOCYTES NFR BLD AUTO: 51.5 % (ref 13.4–35)
MCHC RBC AUTO-ENTMCNC: 32 % (ref 30–34)
MCV RBC AUTO: 81 FL (ref 79–97)
MONOCYTES # (AUTO): 0.3 K/MM3 (ref 0–0.8)
MONOCYTES % (AUTO): 4.7 % (ref 0–7.3)
PLATELET # BLD: 319 K/MM3 (ref 140–440)
RBC # BLD AUTO: 4.42 M/MM3 (ref 3.65–5.03)

## 2022-02-24 RX ADMIN — PIPERACILLIN AND TAZOBACTAM SCH MLS/HR: 4; .5 INJECTION, POWDER, FOR SOLUTION INTRAVENOUS at 05:10

## 2022-02-24 RX ADMIN — Medication SCH ML: at 09:01

## 2022-02-24 RX ADMIN — FAMOTIDINE SCH MG: 10 INJECTION, SOLUTION INTRAVENOUS at 09:01

## 2022-02-24 RX ADMIN — SODIUM CHLORIDE SCH MLS/HR: 0.45 INJECTION, SOLUTION INTRAVENOUS at 03:23

## 2022-02-24 RX ADMIN — HEPARIN SODIUM SCH UNIT: 5000 INJECTION, SOLUTION INTRAVENOUS; SUBCUTANEOUS at 09:01

## 2022-02-24 NOTE — DISCHARGE SUMMARY
Providers





- Providers


Date of Admission: 


02/22/22 17:23





Date of discharge: 02/24/22


Attending physician: 


DOMI WALLER MD





                                        





02/22/22 17:23


Consult to Physician [CONS] Routine 


   Comment: 


   Consulting Provider: GALLO FERNANDEZ


   Physician Instructions: 


   Reason For Exam: choledocholithiasis





02/23/22 07:08


Consult to Physician [CONS] Routine 


   Comment: 


   Consulting Provider: GIORGIO HUDSON


   Physician Instructions: 


   Reason For Exam: S/p cholecystectomy











Primary care physician: 


NATALIE ROSE MD








Hospitalization


Reason for admission: Choledocholithiasis


Condition: Good


Pertinent studies: 





Reviewed


Procedures: 





Laparoscopic cholecystectomy; ERCP


Hospital course: 





Patient is a 26-year-old female with no significant past medical history who 

underwent laparoscopic cholecystectomy with general surgery on 2/22/2022 without

 any complications.  There was concern for choledocholithiasis due to a 

visualized filling defect within the distal CBD.  The patient was admitted for 

ERCP with gastroenterology; it was performed on 2/23/2022 without any 

complications.  The patient will follow up with general surgery in the 

outpatient setting in approximately 2 weeks.  Patient is medically cleared for 

discharge.


Disposition: 01 HOME / SELF CARE / HOMELESS


Final Discharge Diagnosis (Prints w/discharge instructions): Acute cholecystitis

 status post laparoscopic cholecystectomy; choledocholithiasis


Time spent for discharge: 45 min





Core Measure Documentation





- Palliative Care


Palliative Care/ Comfort Measures: Not Applicable





- Core Measures


Any of the following diagnoses?: none





Exam





- Constitutional


Vitals: 


                                        











Temp Pulse Resp BP Pulse Ox


 


 98.1 F   67   18   105/71   97 


 


 02/24/22 05:57  02/24/22 05:57  02/24/22 05:57  02/24/22 05:57  02/24/22 07:00











General appearance: Present: no acute distress, well-nourished, obese





- EENT


Eyes: Present: PERRL, EOM intact


ENT: hearing intact, clear oral mucosa, dentition normal





- Neck


Neck: Present: supple, normal ROM





- Respiratory


Respiratory effort: normal


Respiratory: bilateral: CTA





- Cardiovascular


Rhythm: regular


Heart Sounds: Present: S1 & S2





- Extremities


Extremities: no ischemia, pulses intact, pulses symmetrical, No edema, normal 

temperature, normal color, Full ROM


Peripheral Pulses: within normal limits





- Abdominal


General gastrointestinal: Present: soft, non-tender, non-distended, normal bowel

 sounds


Female genitourinary: Present: deferred





- Rectal


Rectal Exam: deferred





- Integumentary


Integumentary: Present: clear, warm, dry





- Musculoskeletal


Musculoskeletal: strength equal bilaterally





- Psychiatric


Psychiatric: appropriate mood/affect, intact judgment & insight, memory intact, 

cooperative





- Neurologic


Neurologic: CNII-XII intact, moves all extremities





- Allied Health


Allied health notes reviewed: nursing





Plan


Activity: advance as tolerated


Diet: low fat


Additional Instructions: Patient is a 26-year-old female with no significant 

past medical history who underwent laparoscopic cholecystectomy with general 

surgery on 2/22/2022 without any complications.  There was concern for 

choledocholithiasis due to a visualized filling defect within the distal CBD.  

The patient was admitted for ERCP with gastroenterology; it was performed on 

2/23/2022 without any complications.  The patient will follow up with general 

surgery in the outpatient setting in approximately 2 weeks.  Patient is 

medically cleared for discharge.


Care Plan Goals: 


Patient is medically cleared for discharge.


Assessment: 


Patient is a 26-year-old female with no significant past medical history who 

underwent laparoscopic cholecystectomy with general surgery on 2/22/2022 without

 any complications.  There was concern for choledocholithiasis due to a 

visualized filling defect within the distal CBD.  The patient was admitted for 

ERCP with gastroenterology; it was performed on 2/23/2022 without any 

complications.  The patient will follow up with general surgery in the 

outpatient setting in approximately 2 weeks.  Patient is medically cleared for 

discharge.


Follow up with: 


NATALIE ROSE MD [Primary Care Provider] - 7 Days


GIORGIO HUDSON MD [Staff Physician] - 14 Days


Forms:  Work/School Release Form


Prescriptions: 


oxyCODONE /ACETAMINOPHEN [Percocet 5/325] 1 tab PO Q6HR PRN #20 tablet


 PRN Reason: Pain

## 2022-02-24 NOTE — POST ANESTHESIA EVALUATION
- Post Anesthesia Evaluation


Patient Participated: Yes


Airway Patent: Yes


Stable Respiratory Function: Yes


Nausea/Vomiting: No


Temp > 96.8F: Yes


Pain Manageable: Yes


Adequeate Hydration: Yes


Anesthesia Complications: No


Other Comments: planned to be discharged

## 2022-02-28 ENCOUNTER — OFFICE VISIT (OUTPATIENT)
Dept: URBAN - METROPOLITAN AREA CLINIC 118 | Facility: CLINIC | Age: 27
End: 2022-02-28